# Patient Record
Sex: MALE | Race: WHITE | URBAN - METROPOLITAN AREA
[De-identification: names, ages, dates, MRNs, and addresses within clinical notes are randomized per-mention and may not be internally consistent; named-entity substitution may affect disease eponyms.]

---

## 2017-08-18 ENCOUNTER — EXTERNAL NURSING HOME DOCUMENTATION (OUTPATIENT)
Dept: INTERNAL MEDICINE CLINIC | Age: 82
End: 2017-08-18

## 2017-08-18 DIAGNOSIS — I25.10 CORONARY ARTERY DISEASE INVOLVING NATIVE HEART WITHOUT ANGINA PECTORIS, UNSPECIFIED VESSEL OR LESION TYPE: ICD-10-CM

## 2017-08-18 DIAGNOSIS — E11.9 TYPE 2 DIABETES MELLITUS WITHOUT COMPLICATION, UNSPECIFIED LONG TERM INSULIN USE STATUS: ICD-10-CM

## 2017-08-18 DIAGNOSIS — R78.81 E COLI BACTEREMIA: Primary | ICD-10-CM

## 2017-08-18 DIAGNOSIS — G89.29 CHRONIC RIGHT SHOULDER PAIN: ICD-10-CM

## 2017-08-18 DIAGNOSIS — G47.33 OSA (OBSTRUCTIVE SLEEP APNEA): ICD-10-CM

## 2017-08-18 DIAGNOSIS — B96.20 E COLI BACTEREMIA: Primary | ICD-10-CM

## 2017-08-18 DIAGNOSIS — M25.511 CHRONIC RIGHT SHOULDER PAIN: ICD-10-CM

## 2017-08-18 NOTE — PROGRESS NOTES
History of Present Illness:   Mr. Ruddy Jones is an [de-identified]year-old white male with past medical history significant for Crohn's disease, status post ileal resection, coronary artery disease, non-ST elevation myocardial infarction, TIA, hypertension, hyperlipidemia, CKD stage 3, ANTONI. He presented to [facility] with acute onset of change in mentation with incidental findings for acute liver injury. In the emergency room, he had CT of head done, which did not show anything. He had elevated liver function test.  Blood cultures were done, which he was found to have E. coli bacteremia. He started having fever. Along with that, he was started on Ceftriaxone two grams every 24 hours, along with Flagyl every 12 hours too. He also had an ERCP done, no CBD stone was found, but had sludge. The patient is to complete IV antibiotics. He will follow up with his primary care physician as an outpatient. Also, during this hospitalization, the patient had blood pressure medications changed/readjusted. His altered mental status was waxing and waning. CT of head was negative. His delirium subsided. Medications have been changed. Doxepin and Gabapentin were discontinued, which helped to improve his mentation. For his acute liver injury, lab work was done. Liver function test was down trending. He was stabilized and was transferred to [facility] for further physical therapy and occupational therapy. I am seeing him here. He is doing okay. No chest pain, no palpitations, no shortness of breath right now. Past Medical History:   1. Coronary artery disease. 2. COPD. 3. Crohn's disease. 4. Cervical spondylosis. 5. Cataract. 6. Diverticulitis. 7. Hiatal hernia. 8. GERD. 9. Hyperlipidemia. 10. Hypertension. 11. IBS. 12. Neuropathy. 13. Obstructive sleep apnea. 14. Pyoderma gangrenosum. 15. Retinal detachment. 16. Systolic murmur. 17. TIA.      Past Surgical History: 1. Tonsillectomy and adenoidectomy. 2. Ileal resection. 3. Repair of the right femur. 4. Total right hip replacement. 5. Total right shoulder replacement. 6. Cardiac catheterization. 7. Vasectomy. 8. Repair of retinal detachment. 9. Jaw surgery. 10. Right hip repair. 11. Skin graft. 12. Colonoscopy. Medications:  Aspirin 81 mg once daily, Lipitor 40 mg h.s., Coreg 25 mg twice a day, Ceftriaxone two grams IV every day, Colace 100 mg every 12 hours, Cymbalta 20 mg one tablet once a day, Pepcid 20 mg at bedtime, Furosemide 80 mg twice a day, Glipizide 5 mg twice a day, Hydralazine 100 mg every 8 hours, Imdur 30 mg one twice a day, Prevacid 30 mg one twice a day, Lidocaine patch 5% topical patch one every day, Loratadine 10 mg at bedtime, Mesalamine 800 mg oral twice a day, Metformin 500 mg once a day, multivitamin one tablet every day, Prednisone 10 mg once every day, MiraLax 17 grams once daily, Senna 8.6 mg one at bedtime,     Social History:  The patient has never been a smoker. He denies any alcohol use or any substance abuse. He lives in [place] [facility] with his wife. He has two adult children. Family History:  Noncontributory. Review of Systems:  A complete review of systems was done. Right now, essentially negative. Physical Examination:    GENERAL:  This is a pleasant white male not appearing in any distress right now. VITALS:  T:  98.9 degrees Fahrenheit. P:  67 per minute. BP:  138/66 mmHg. SaO2:  98% on room air. HEENT:  No abnormality detected. NECK:  Supple, no JVD, no carotid bruits, no thyromegaly. CHEST:  Chest is clear to auscultation bilaterally. No rales, no rhonchi. CARDIOVASCULAR:  S1, S2 normal.  Regular rate and rhythm. ABDOMEN:  Soft, nontender, nondistended, bowel sounds present. EXTREMITIES:  No edema is noticed. NEUROLOGICAL:  Alert and oriented x3. Cranial nerves II - XII grossly intact. Motor is 5/5 bilaterally.   Sensory is within normal limits. Assessment and Plan:   1. E. coli bacteremia. The patient's culture was positive for E. coli. UA was negative. The patient was started on Ceftriaxone two grams every 24 hours along with Metronidazole 500 mg twice daily. He will continue on Ceftriaxone until 09/08/2017.    2. Altered mental status, which has improved after discontinuing Doxepin and Gabapentin. He is already on Cymbalta every day. He is doing better. 3. Acute liver injury. Liver function test is better right now. ERCP was done. CBD had no stones, but sludge. He is doing better. 4. Acute/chronic kidney disease, it was prerenal.  IV fluids were given. His kidney function test has improved. We will do CBC and CMP. 5. Crohn's disease, status post ileal resection. The patient is on Mesalamine. We will continue it for now,. 6. Type 2 diabetes mellitus. He is on Metformin, Glipizide. A1C was 8.7. We will monitor his blood sugar. 7. Obstructive sleep apnea. He is on CPAP machine. We will continue it for now. 8. Hypertension. Blood pressure is under control. He is on Hytrin, Hydralazine, Imdur. Blood pressure is under control. THIS IS NOT A COMPLETE MEDICAL RECORD ON THIS PATIENT.    THIS IS A PATIENT AT [facility].    PLEASE CONTACT THE FACILITY LISTED BELOW FOR MORE INFORMATION ON THIS PATIENT.    THE COMPLETE RECORD RESIDES WITH THIS LONG TERM CARE FACILITY

## 2017-08-23 ENCOUNTER — EXTERNAL NURSING HOME DOCUMENTATION (OUTPATIENT)
Dept: INTERNAL MEDICINE CLINIC | Age: 82
End: 2017-08-23

## 2017-08-23 DIAGNOSIS — B96.20 E COLI BACTEREMIA: Primary | ICD-10-CM

## 2017-08-23 DIAGNOSIS — M25.511 RIGHT SHOULDER PAIN, UNSPECIFIED CHRONICITY: ICD-10-CM

## 2017-08-23 DIAGNOSIS — R63.0 DECREASED APPETITE: ICD-10-CM

## 2017-08-23 DIAGNOSIS — R40.0 DROWSINESS: ICD-10-CM

## 2017-08-23 DIAGNOSIS — R78.81 E COLI BACTEREMIA: Primary | ICD-10-CM

## 2017-08-23 PROBLEM — E11.9 TYPE 2 DIABETES MELLITUS WITHOUT COMPLICATION (HCC): Status: ACTIVE | Noted: 2017-08-23

## 2017-08-23 PROBLEM — I25.10 CORONARY ARTERY DISEASE INVOLVING NATIVE HEART WITHOUT ANGINA PECTORIS: Status: ACTIVE | Noted: 2017-08-23

## 2017-08-23 PROBLEM — G47.33 OSA (OBSTRUCTIVE SLEEP APNEA): Status: ACTIVE | Noted: 2017-08-23

## 2017-08-23 PROBLEM — G89.29 CHRONIC RIGHT SHOULDER PAIN: Status: ACTIVE | Noted: 2017-08-23

## 2017-08-23 NOTE — PROGRESS NOTES
THIS IS NOT A COMPLETE MEDICAL RECORD ON THIS PATIENT. THIS IS A PATIENT AT [facility]. PLEASE CONTACT THE FACILITY LISTED BELOW FOR MORE INFORMATION ON THIS PATIENT. THE COMPLETE RECORD RESIDES WITH THIS LONG TERM CARE FACILITY. HISTORY OF PRESENT ILLNESS  Rolando Mccartney is a [de-identified] y.o. male. This patient is currently under the care of Dr. Ana Ryan at [facility].  The patient was admitted to this facility following hospitalization related to acute onset of change in mentation. Per the patient's wife, he had been feeling very chilled and got into a warm bath and was then unable to get out. In the emergency room, he had negative CT of head. His liver enzymes were found to be elevated. Blood cultures indicated E. coli bacteremia. He  had an ERCP done, no CBD stone was found, but had sludge. The patient was discharged with orders to continue Ceftriaxone 2 g daily until September 8, 2017. The patient's past medical history also includes CAD, COPD, Crohn's disease, cervical spondylosis, hiatal hernia, GERD, HLD, HTN, IBS, neuropathy, ANTONI, CKD, and DM. The patient is seen today per family request.  The patient has apparently had intermittent complaints of right shoulder pain throughout hospitalization. He was ordered PRN Tramadol following last visit with Dr. Ana Ryan. However, he has now been noted to have increased drowsiness and decreased appetite. Vitals:  147/63, 62, 16, 97.5, 96%  HPI    Review of Systems   Constitutional: Negative for chills and fever. HENT: Negative for congestion. Respiratory: Negative for cough and shortness of breath. Cardiovascular: Negative for chest pain and leg swelling. Gastrointestinal: Negative for abdominal pain. Genitourinary: Negative. Musculoskeletal: Positive for joint pain and neck pain. Skin: Negative. Physical Exam   Constitutional: He appears well-developed and well-nourished. No distress. HENT:   Head: Normocephalic. Cardiovascular: Normal rate and regular rhythm. Pulmonary/Chest: Effort normal and breath sounds normal. No respiratory distress. He has no wheezes. He has no rales. Abdominal: Soft. Bowel sounds are normal. He exhibits no distension. There is no tenderness. Musculoskeletal: He exhibits tenderness. He exhibits no edema. Right shoulder pain   Neurological:   Appears drowsy  Alert to voice and answering questions appropriately   Skin: Skin is warm and dry. Nursing note and vitals reviewed. ASSESSMENT and PLAN  Encounter Diagnoses   Name Primary?  E coli bacteremia Yes    Right shoulder pain, unspecified chronicity     Drowsiness     Decreased appetite      Will discontinue Tramadol. Increase Tylenol to 500 mg po tid. Increase Voltaren gel 1% to 2 g tid to right shoulder    Will order x-ray of right shoulder and cervical spine x-ray. Will order CBC, CMP STAT. Will order U/A C&S. Reviewed medications and side effects     Nursing to continue to monitor closely and notify MD/NP of any change in condition. Discussed above plan of care with Dr. Nelson Lockhart and patient's wife and son who are at bedside.

## 2017-08-24 ENCOUNTER — EXTERNAL NURSING HOME DOCUMENTATION (OUTPATIENT)
Dept: INTERNAL MEDICINE CLINIC | Age: 82
End: 2017-08-24

## 2017-08-24 DIAGNOSIS — E87.6 HYPOKALEMIA: Primary | ICD-10-CM

## 2017-08-24 DIAGNOSIS — R78.81 E COLI BACTEREMIA: ICD-10-CM

## 2017-08-24 DIAGNOSIS — M25.511 CHRONIC RIGHT SHOULDER PAIN: ICD-10-CM

## 2017-08-24 DIAGNOSIS — G89.29 CHRONIC RIGHT SHOULDER PAIN: ICD-10-CM

## 2017-08-24 DIAGNOSIS — B96.20 E COLI BACTEREMIA: ICD-10-CM

## 2017-08-24 DIAGNOSIS — N18.9 CKD (CHRONIC KIDNEY DISEASE), UNSPECIFIED STAGE: ICD-10-CM

## 2017-08-24 NOTE — PROGRESS NOTES
THIS IS NOT A COMPLETE MEDICAL RECORD ON THIS PATIENT. THIS IS A PATIENT AT [facility]. PLEASE CONTACT THE FACILITY LISTED BELOW FOR MORE INFORMATION ON THIS PATIENT. THE COMPLETE RECORD RESIDES WITH THIS LONG TERM CARE FACILITY. HISTORY OF PRESENT ILLNESS  Christy Medley is a [de-identified] y.o. male. This patient is currently under the care of Dr. Jose Miguel oRck at [facility].  The patient was admitted to this facility following hospitalization related to acute onset of change in mentation. In the emergency room, he had negative CT of head. His liver enzymes were found to be elevated. Blood cultures indicated E. coli bacteremia. He  had an ERCP done, no CBD stone was found, but had sludge. The patient was discharged with orders to continue Ceftriaxone 2 g daily until September 8, 2017. The patient's past medical history also includes CAD, COPD, Crohn's disease, cervical spondylosis, hiatal hernia, GERD, HLD, HTN, IBS, neuropathy, ANTONI, CKD, and DM. Labs drawn overnight indicated BUN-57, Creatinine-1.86, K-2.7, Na-138. Patient received two doses of Potassium 20 mEq overnight and one dose of Potassium 40 mEq this morning. Patient has expressed interest in discharging as soon as possible. He states he would feel more comfortable at home. He would like to discharge home today. HPI    Review of Systems   Constitutional: Negative for chills and fever. HENT: Negative for congestion. Respiratory: Negative for cough and shortness of breath. Cardiovascular: Negative for chest pain and leg swelling. Gastrointestinal: Negative for abdominal pain. Genitourinary: Negative. Musculoskeletal: Positive for joint pain and neck pain. Skin: Negative. Physical Exam   Constitutional: He is oriented to person, place, and time. He appears well-developed and well-nourished. No distress. In power wheelchair   HENT:   Head: Normocephalic. Cardiovascular: Normal rate and regular rhythm. Pulmonary/Chest: Effort normal and breath sounds normal. No respiratory distress. He has no wheezes. He has no rales. Abdominal: Soft. Bowel sounds are normal. He exhibits no distension. There is no tenderness. Musculoskeletal: He exhibits tenderness. He exhibits no edema. Right shoulder pain   Neurological: He is alert and oriented to person, place, and time. Skin: Skin is warm and dry. Nursing note and vitals reviewed. ASSESSMENT and PLAN  Encounter Diagnoses   Name Primary?  Hypokalemia Yes    CKD (chronic kidney disease), unspecified stage     Chronic right shoulder pain     E coli bacteremia      Repeat BMP at 1 PM.  Discussed with patient recommendation to remain at this facility at least overnight for medication administration and repeat lab results. Await repeat lab results for consideration of discharge tomorrow. Discussed risks associated with hypokalemia in detail. Patient agrees to stay overnight and reconsider discharge tomorrow based on lab results. Lab results and schedule of future lab studies reviewed with patient  Reviewed medications and side effects in detail    Nursing to continue to monitor closely and notify MD/NP of any change in condition. Discussed above plan of care with Dr. Serge Bahena.

## 2017-08-25 ENCOUNTER — EXTERNAL NURSING HOME DOCUMENTATION (OUTPATIENT)
Dept: INTERNAL MEDICINE CLINIC | Age: 82
End: 2017-08-25

## 2017-08-25 DIAGNOSIS — I25.10 CORONARY ARTERY DISEASE INVOLVING NATIVE HEART WITHOUT ANGINA PECTORIS, UNSPECIFIED VESSEL OR LESION TYPE: ICD-10-CM

## 2017-08-25 DIAGNOSIS — E11.9 TYPE 2 DIABETES MELLITUS WITHOUT COMPLICATION, UNSPECIFIED LONG TERM INSULIN USE STATUS: ICD-10-CM

## 2017-08-25 DIAGNOSIS — G89.29 CHRONIC RIGHT SHOULDER PAIN: ICD-10-CM

## 2017-08-25 DIAGNOSIS — M25.511 CHRONIC RIGHT SHOULDER PAIN: ICD-10-CM

## 2017-08-25 DIAGNOSIS — E87.6 HYPOKALEMIA: Primary | ICD-10-CM

## 2017-08-25 NOTE — PROGRESS NOTES
Subjective:  Mr. Sasha Ramirez was admitted to the hospital with E. coli bacteremia and confusion. He was started on IV antibiotics and was transferred to [facility] for antibiotic therapy and physical therapy. The patient continues to complain of neck pain and right shoulder pain. He already underwent right shoulder arthroplasty. He had x-ray of neck and shoulder done. Shoulder did not show any fractures and arthritis. Neck x-ray showed some arthritis. He was placed on Diclofenac gel for his neck, since he mentioned that pain medication does not help his pain, which helped a little bit. He continues with medications. He had labs done because of confusion, which showed that he has very low potassium at 2.7. He is on high dose of Lasix for his heart, but he did not come here with any order of potassium, so he has received 40 mEq of potassium first day and also in the morning and repeat lab work showed potassium level came back to normal at 4.2. He would like to go home. Right now, he is sitting comfortably. No chest pain, no palpitations, no shortness of breath. Objective:     VITALS:  T:  98 degrees Fahrenheit. P:  61 per minute. BP:  142/59 mmHg. SaO2:  94% on room air. Weight is 185 pounds. HEENT:  No abnormality detected. NECK:  Supple, no JVD, no carotid bruits, no thyromegaly. CHEST:  Chest is clear to auscultation bilaterally. No rales, no rhonchi. CARDIOVASCULAR:  S1, S2 normal.  Regular rate and rhythm. ABDOMEN:  Soft, nontender, nondistended, bowel sounds present. EXTREMITIES:  No edema is noticed. Right shoulder - mild tenderness is present. Assessment and Plan:   1. Severe hypokalemia. The patient's potassium level was replaced. I am going to send him home with 20 mEq of potassium every day. He will see primary care physician and repeat labs in two weeks. He will continue his Lasix too. 2. Right shoulder pain. X-ray was negative for any fractures. We will continue with Diclofenac gel for that.   3. E. coli bacteremia. He will continue with IV antibiotics at home and follow up with his ID specialist for that. 4. Valvular heart disease. The patient is on Coreg and Lasix. We will continue it for now. 5. Type 2 diabetes mellitus. He is taking Glipizide. Blood sugar is under control. THIS IS NOT A COMPLETE MEDICAL RECORD ON THIS PATIENT.    THIS IS A PATIENT AT [facility].    PLEASE CONTACT THE FACILITY LISTED BELOW FOR MORE INFORMATION ON THIS PATIENT.    THE COMPLETE RECORD RESIDES WITH THIS LONG TERM CARE FACILITY